# Patient Record
Sex: FEMALE | Employment: FULL TIME | URBAN - METROPOLITAN AREA
[De-identification: names, ages, dates, MRNs, and addresses within clinical notes are randomized per-mention and may not be internally consistent; named-entity substitution may affect disease eponyms.]

---

## 2023-06-05 ENCOUNTER — HOSPITAL ENCOUNTER (OUTPATIENT)
Dept: PHYSICAL THERAPY | Facility: CLINIC | Age: 43
Setting detail: THERAPIES SERIES
Discharge: HOME | End: 2023-06-05
Payer: COMMERCIAL

## 2023-06-05 PROCEDURE — 97110 THERAPEUTIC EXERCISES: CPT

## 2023-06-05 PROCEDURE — 97161 PT EVAL LOW COMPLEX 20 MIN: CPT

## 2023-06-05 NOTE — CONSULTS
task    [] Previously independent with all except: [x] Currently independent with all except:    Bathing  [] Assist [] Assist    Dress/grooming [] Assist [] Assist    Transfer/mobility [] Assist [] Assist    Feeding [] Assist [] Assist    Toileting [] Assist [] Assist    Driving [] Assist [] Assist    Housekeeping [] Assist [x] Assist     Grocery shop/meal prep [] Assist [x] Assist        Gait Prior level of function Current level of function    [x] Independent  [] Assist [x] Independent  [] Assist   Device: [x] Independent [x] Independent    [] Straight Cane [] Quad cane [] Straight Cane [] Quad cane    [] Standard walker [] Rolling walker   [] 4 wheeled walker [] Standard walker [] Rolling walker   [] 4 wheeled walker    [] Wheelchair [] Wheelchair       Function:  Hand Dominance  [x] Right  [] Left  Marital Status Lives with her    Employement Remote medical coder   Job status Full time  Took some time off due to the concussion symptoms   Work Activities/duties  Desk job, flexible hours  Slight upward position of her monitors   Recreational Activities Hiking, reading         Pain present? Yes    Location Neck    Pain Rating currently 2/10   Pain at worse Neck: 4/10  Back: 3/10    Pain at best 0/10   Description of pain Neck: stiffness, some sharp pain in the lower/middle neck   Altered Sensation Intact    What makes it worse Bending head down, turning her head   What makes it better Heat, alberto-Jones, ibuprofen, tylenol   Symptom progression Gradually improving   Sleep Interrupted sleep, unrelated to her neck pain   Sleeps on her side            Objective:        STRENGTH  STRENGTH    Left Right  Left Right   C5 Shld Abd 5 5 L1-2 Hip Flex 5 5   Shld Flexion 5 5 Hip Abd     Shld IR   L3-4 Knee Ext 5 5   Shld ER   L4 Ankle DF 5 5   C6 Elb Flex 5 5 L5 EHL     C7 Elb Ext 5 5 S1 Plant.  Flex     C8 EPL   Abdominals     T1 Fing Abd   Erector Spinae         Cervical ROM Left Right   Flexion WFL*

## 2023-06-08 ENCOUNTER — HOSPITAL ENCOUNTER (OUTPATIENT)
Dept: PHYSICAL THERAPY | Facility: CLINIC | Age: 43
Setting detail: THERAPIES SERIES
Discharge: HOME | End: 2023-06-08
Payer: COMMERCIAL

## 2023-06-08 PROCEDURE — 97110 THERAPEUTIC EXERCISES: CPT

## 2023-06-08 NOTE — FLOWSHEET NOTE
[email or web address]  Date: 06/05/2023  Prepared by: Shanon Keith     Exercises  - Seated Scapular Retraction  - 3-4 x daily - 7 x weekly - 2 sets - 10 reps - 5 sec hold  - Seated Shoulder Circles  - 3-4 x daily - 7 x weekly - 2 sets - 10 reps  - Supine Chin Tuck  - 1-2 x daily - 7 x weekly - 2 sets - 10 reps - 5 sec hold  - Supine Cervical Rotation AROM on Pillow  - 1-2 x daily - 7 x weekly - 2 sets - 10 reps - 5 sec hold  - Gentle Levator Scapulae Stretch  - 1-2 x daily - 5 x weekly - 2 sets - 3 reps - 15 hold  - Seated Gentle Upper Trapezius Stretch  - 1-2 x daily - 5 x weekly - 2 sets - 3 reps - 15 hold  - Correct Seated Posture  - 1 x daily - 7 x weekly - 3 sets - 10 reps    Comprehension of Education:  [x] Verbalizes understanding. [] Demonstrates understanding. [] Needs review. [x] Demonstrates/verbalizes HEP/Ed previously given. Plan: [x] Continue current frequency toward long and short term goals.           Time In: 3:00pm            Time Out: 3:45pm    Electronically signed by:  Tete Chambers PTA

## 2023-06-19 ENCOUNTER — HOSPITAL ENCOUNTER (OUTPATIENT)
Dept: PHYSICAL THERAPY | Facility: CLINIC | Age: 43
Setting detail: THERAPIES SERIES
Discharge: HOME | End: 2023-06-19
Payer: COMMERCIAL

## 2023-06-19 PROCEDURE — 97110 THERAPEUTIC EXERCISES: CPT

## 2023-06-19 PROCEDURE — 97140 MANUAL THERAPY 1/> REGIONS: CPT

## 2023-06-19 NOTE — FLOWSHEET NOTE
[x] [facility]  Outpatient Rehabilitation &  Therapy  [facility]   [place]: [phone]  F: [phone]      Physical Therapy Daily Treatment Note    Date:  2023  Patient Name:  Anna Angel    :  1980  MRN: [id number]  Physician: Dr. Blane Montalvo: Medical [place] ( vs, auth after )  Medical Diagnosis: V49.50XA, Sprain of ligaments of cervical spine (S13.4XXA) Concussion with loss of consciousness (S06.0X0A), Pain in left shoulder (M25.512), Myalgia (M79.18), Low back pain unspecified (M54.50)  Rehab Codes: M25.512, M54.2, R29.3, M62.838   Onset Date: 23                 Next 's appt. : --   Visit# / total visits:     Cancels/No Shows: 0/0    Subjective:    Pain:  [] Yes  [x] No Location: neck pain  Rating: (0-10 scale) 0/10  Pain altered Tx:  [x] No  [] Yes  Action:  Comments: Patient arrives reporting that she had some pain over the weekend. On her way to therapy today she fell stepping out of her garage. Feels that she just \"was not paying attention. \" Requests that we refrain from too many standing or leg exercises. Objective:   Todays Treatment:  Exercises:  Exercise    [id number] Reps/ Time Weight/ Level Comments             Pulley's   2/'/2' Flex/abd               Corner Pectoral Stretch  3x30\"  T/Y               SB gastroc stretch  held     Supine HS strap stretch  held bilat    Hip ER stretch  held     Posterior pelvic tilts with march  held     Seated scapular retractions  20x5\"     Standing Chin Tucks 10x10\"       Upper Trap Stretch  3x20\" bilat    Levator Scap Stretch  3x20\" bilat           Tband          Rows   x20 Green     Extension   x20 Green     Bilat ER   x20 Green     Bilat horiz abd   x20 Green               Shoulder abduction  2x10 1# Standing at wall    Shoulder scaption  2x10 1# Standing at wall    Shoulder flexion  2x10 1# Standing at wall          Other:     FRSL C5-C6

## 2023-06-22 ENCOUNTER — HOSPITAL ENCOUNTER (OUTPATIENT)
Dept: PHYSICAL THERAPY | Facility: CLINIC | Age: 43
Setting detail: THERAPIES SERIES
Discharge: HOME | End: 2023-06-22
Payer: COMMERCIAL

## 2023-06-22 PROCEDURE — 97110 THERAPEUTIC EXERCISES: CPT

## 2023-06-22 PROCEDURE — 97140 MANUAL THERAPY 1/> REGIONS: CPT

## 2023-06-22 NOTE — FLOWSHEET NOTE
[x] [facility]  Outpatient Rehabilitation &  Therapy  [facility]   [place]: [phone]  F: [phone]      Physical Therapy Daily Treatment Note    Date:  2023  Patient Name:  Mary Velazquez    :  1980  MRN: [id number]  Physician: Dr. Wendy Hinson: Medical [place] ( vs, auth after )  Medical Diagnosis: V49.50XA, Sprain of ligaments of cervical spine (S13.4XXA) Concussion with loss of consciousness (S06.0X0A), Pain in left shoulder (M25.512), Myalgia (M79.18), Low back pain unspecified (M54.50)  Rehab Codes: M25.512, M54.2, R29.3, M62.838   Onset Date: 23                 Next 's appt. : --   Visit# / total visits:     Cancels/No Shows: 0/0    Subjective:    Pain:  [] Yes  [x] No Location: neck pain  Rating: (0-10 scale) 0/10  Pain altered Tx:  [x] No  [] Yes  Action:  Comments: Patient arrives stating improved neck pain today. Reports she got her nails done last Saturday and had pain that day and  because she was sitting in the chair to get her nails done for 1.5-2 hours. Had mild low back pain yesterday because she was driving and the traffic made her nervous. Patient reports she had some pain near her jaw on the left side yesterday, none today. Objective:   Todays Treatment:  Exercises:  Exercise    [id number] Reps/ Time Weight/ Level Comments             Pulley's   2/2' Flex/abd               Standing Chin Tucks 10x10\"       Upper Trap Stretch  3x30\" bilat    Levator Scap Stretch  3x30\" bilat     Corner pect stretch  3x30\" Y/T    Scalene stretch  3x15\"  HEP   Mid-back stretch  3x15\"  HEP         Tband          Rows   x20 Green     Extension   x20 Green     Bilat ER   x20 Green     Bilat horiz abd   x20 Green               Shoulder abduction  x10 1# Standing at wall    Shoulder scaption  x10 1# Standing at wall    Shoulder flexion  x10 1# Standing at wall          Prone Bench      Rows  x10

## 2023-06-26 ENCOUNTER — HOSPITAL ENCOUNTER (OUTPATIENT)
Dept: PHYSICAL THERAPY | Facility: CLINIC | Age: 43
Setting detail: THERAPIES SERIES
Discharge: HOME | End: 2023-06-26
Payer: COMMERCIAL

## 2023-06-26 PROCEDURE — 97110 THERAPEUTIC EXERCISES: CPT

## 2023-06-26 PROCEDURE — 97140 MANUAL THERAPY 1/> REGIONS: CPT

## 2023-06-29 ENCOUNTER — HOSPITAL ENCOUNTER (OUTPATIENT)
Dept: PHYSICAL THERAPY | Facility: CLINIC | Age: 43
Setting detail: THERAPIES SERIES
Discharge: HOME | End: 2023-06-29
Payer: COMMERCIAL

## 2023-06-29 PROCEDURE — 97110 THERAPEUTIC EXERCISES: CPT

## 2023-07-06 ENCOUNTER — HOSPITAL ENCOUNTER (OUTPATIENT)
Dept: PHYSICAL THERAPY | Facility: CLINIC | Age: 43
Setting detail: THERAPIES SERIES
Discharge: HOME | End: 2023-07-06
Payer: COMMERCIAL

## 2023-07-06 PROCEDURE — 97140 MANUAL THERAPY 1/> REGIONS: CPT

## 2023-07-06 PROCEDURE — 97110 THERAPEUTIC EXERCISES: CPT

## 2023-07-10 ENCOUNTER — HOSPITAL ENCOUNTER (OUTPATIENT)
Dept: PHYSICAL THERAPY | Facility: CLINIC | Age: 43
Setting detail: THERAPIES SERIES
Discharge: HOME | End: 2023-07-10
Payer: COMMERCIAL

## 2023-07-10 PROCEDURE — 97110 THERAPEUTIC EXERCISES: CPT

## 2023-07-10 NOTE — FLOWSHEET NOTE
pain, get back to exercise         Rehab Potential:  [x] Good  [] Fair  [] Poor   Suggested Professional Referral:  [x] No  [] Yes:  Barriers to Goal Achievement[de-identified]  [x] No  [] Yes:  Domestic Concerns:  [x] No  [] Yes:    Pt. Education:  [x] Yes  [] No  [x] Reviewed Prior HEP/Ed, cervical stretching, decrease time and speed on treadmill, avoid upper trap compensation. Method of Education: [x] Verbal  [x] Demo  [] Written    Access Code: [email or web address]  URL: [email or web address]  Date: 06/19/2023  Prepared by: Cleve Debar    Exercises  - Standing Shoulder Row with Anchored Resistance  - 1 x daily - 7 x weekly - 3 sets - 10 reps  - Shoulder Extension with Resistance  - 1 x daily - 7 x weekly - 3 sets - 10 reps  - Standing Shoulder External Rotation with Resistance  - 1 x daily - 7 x weekly - 3 sets - 10 reps  - Standing Shoulder Horizontal Abduction with Resistance  - 1 x daily - 7 x weekly - 3 sets - 10 reps  - Wall Push Up with Plus  - 1 x daily - 7 x weekly - 3 sets - 10 reps    Access Code: [email or web address]  URL: [email or web address]/  Date: 06/05/2023  Prepared by: Cleve Debar     Exercises  - Seated Scapular Retraction  - 3-4 x daily - 7 x weekly - 2 sets - 10 reps - 5 sec hold  - Seated Shoulder Circles  - 3-4 x daily - 7 x weekly - 2 sets - 10 reps  - Supine Chin Tuck  - 1-2 x daily - 7 x weekly - 2 sets - 10 reps - 5 sec hold  - Supine Cervical Rotation AROM on Pillow  - 1-2 x daily - 7 x weekly - 2 sets - 10 reps - 5 sec hold  - Gentle Levator Scapulae Stretch  - 1-2 x daily - 5 x weekly - 2 sets - 3 reps - 15 hold  - Seated Gentle Upper Trapezius Stretch  - 1-2 x daily - 5 x weekly - 2 sets - 3 reps - 15 hold  - Correct Seated Posture  - 1 x daily - 7 x weekly - 3 sets - 10 reps      Comprehension of Education:  [x] Verbalizes understanding. [] Demonstrates understanding. [] Needs review. [x] Demonstrates/verbalizes HEP/Ed previously given.      Plan: [x] Continue current frequency toward

## 2023-07-13 ENCOUNTER — APPOINTMENT (OUTPATIENT)
Dept: PHYSICAL THERAPY | Facility: CLINIC | Age: 43
End: 2023-07-13
Payer: COMMERCIAL

## 2023-07-18 ENCOUNTER — HOSPITAL ENCOUNTER (OUTPATIENT)
Dept: PHYSICAL THERAPY | Facility: CLINIC | Age: 43
Setting detail: THERAPIES SERIES
Discharge: HOME | End: 2023-07-18
Payer: COMMERCIAL

## 2023-07-18 PROCEDURE — 97110 THERAPEUTIC EXERCISES: CPT

## 2023-08-01 ENCOUNTER — HOSPITAL ENCOUNTER (OUTPATIENT)
Dept: PHYSICAL THERAPY | Facility: CLINIC | Age: 43
Setting detail: THERAPIES SERIES
Discharge: HOME | End: 2023-08-01
Payer: COMMERCIAL

## 2023-08-01 PROCEDURE — 97161 PT EVAL LOW COMPLEX 20 MIN: CPT

## 2023-08-01 PROCEDURE — 97110 THERAPEUTIC EXERCISES: CPT

## 2023-08-09 ENCOUNTER — HOSPITAL ENCOUNTER (OUTPATIENT)
Dept: PHYSICAL THERAPY | Facility: CLINIC | Age: 43
Setting detail: THERAPIES SERIES
Discharge: HOME | End: 2023-08-09
Payer: COMMERCIAL

## 2023-08-09 PROCEDURE — 97110 THERAPEUTIC EXERCISES: CPT

## 2023-08-15 ENCOUNTER — HOSPITAL ENCOUNTER (OUTPATIENT)
Dept: PHYSICAL THERAPY | Facility: CLINIC | Age: 43
Setting detail: THERAPIES SERIES
Discharge: HOME | End: 2023-08-15
Payer: COMMERCIAL

## 2023-08-15 PROCEDURE — 97110 THERAPEUTIC EXERCISES: CPT

## 2023-08-17 ENCOUNTER — HOSPITAL ENCOUNTER (OUTPATIENT)
Dept: PHYSICAL THERAPY | Facility: CLINIC | Age: 43
Setting detail: THERAPIES SERIES
Discharge: HOME | End: 2023-08-17
Payer: COMMERCIAL

## 2023-08-17 PROCEDURE — 97110 THERAPEUTIC EXERCISES: CPT

## 2023-08-22 ENCOUNTER — HOSPITAL ENCOUNTER (OUTPATIENT)
Dept: PHYSICAL THERAPY | Facility: CLINIC | Age: 43
Setting detail: THERAPIES SERIES
Discharge: HOME | End: 2023-08-22
Payer: COMMERCIAL

## 2023-08-22 PROCEDURE — 97110 THERAPEUTIC EXERCISES: CPT

## 2023-08-24 ENCOUNTER — HOSPITAL ENCOUNTER (OUTPATIENT)
Dept: PHYSICAL THERAPY | Facility: CLINIC | Age: 43
Setting detail: THERAPIES SERIES
Discharge: HOME | End: 2023-08-24
Payer: COMMERCIAL

## 2023-08-24 NOTE — CARE COORDINATION
[] [facility] - [facility] &  Therapy  [address]    [phone]  F: [phone]   [] [address] [facility]  [address]   [address]  P: [phone]  F: [phone]  [] [address] &  Therapy  [address] [facility]  P: [phone]  F: [phone] [] Jeff Chauhan  P: [phone]  F: [phone]  [x] [address] [facility]  [address] [address]   [address]   [place]: [phone]  F: [phone]   [] [address] [facility]  [address]  [place]: [phone]   F: [phone]     Physical Therapy Cancel/No Show note    Date: 2023  Patient: Kelly Bowles  : 1980  MRN: [id number]    Cancels/No Shows to date: 1    For today's appointment patient:    [x]  Cancelled    [] Rescheduled appointment    [] No-show     Reason given by patient:    [x]  Patient ill    []  Conflicting appointment    [] No transportation      [] Conflict with work    [] No reason given    [] Weather related    [] [id number]    [] Other:      Comments:        [] Next appointment was confirmed    Electronically signed by: Rudy Luna PT

## 2023-09-15 ENCOUNTER — OFFICE VISIT (OUTPATIENT)
Age: 43
End: 2023-09-15

## 2023-09-15 VITALS
HEIGHT: 65 IN | BODY MASS INDEX: 34.29 KG/M2 | HEART RATE: 75 BPM | SYSTOLIC BLOOD PRESSURE: 130 MMHG | DIASTOLIC BLOOD PRESSURE: 74 MMHG | WEIGHT: 205.8 LBS

## 2023-09-15 DIAGNOSIS — Z12.31 ENCOUNTER FOR SCREENING MAMMOGRAM FOR BREAST CANCER: ICD-10-CM

## 2023-09-15 DIAGNOSIS — F33.41 RECURRENT MAJOR DEPRESSIVE DISORDER, IN PARTIAL REMISSION (HCC): Primary | ICD-10-CM

## 2023-09-15 DIAGNOSIS — K57.90 DIVERTICULOSIS: Primary | ICD-10-CM

## 2023-09-15 DIAGNOSIS — F41.8 SITUATIONAL ANXIETY: ICD-10-CM

## 2023-09-15 PROBLEM — E78.5 HYPERLIPIDEMIA: Status: ACTIVE | Noted: 2023-09-15

## 2023-09-15 PROBLEM — G25.0 ESSENTIAL TREMOR: Status: ACTIVE | Noted: 2021-11-15

## 2023-09-15 PROBLEM — R20.0 NUMBNESS OF HAND: Status: ACTIVE | Noted: 2023-09-15

## 2023-09-15 PROBLEM — F41.9 ANXIETY: Status: ACTIVE | Noted: 2022-06-07

## 2023-09-15 PROBLEM — K59.00 CONSTIPATION: Status: ACTIVE | Noted: 2022-01-27

## 2023-09-15 PROBLEM — F32.A DEPRESSION: Status: ACTIVE | Noted: 2022-06-07

## 2023-09-15 PROBLEM — N92.0 MENORRHAGIA WITH REGULAR CYCLE: Status: ACTIVE | Noted: 2022-01-27

## 2023-09-15 PROBLEM — I10 HYPERTENSION: Status: ACTIVE | Noted: 2023-09-15

## 2023-09-15 PROBLEM — E11.9 TYPE 2 DIABETES MELLITUS WITHOUT COMPLICATION (HCC): Status: ACTIVE | Noted: 2023-09-15

## 2023-09-15 PROBLEM — E03.9 HYPOTHYROIDISM: Status: ACTIVE | Noted: 2022-06-07

## 2023-09-15 PROBLEM — N94.819 VULVODYNIA: Status: ACTIVE | Noted: 2022-01-27

## 2023-09-15 PROBLEM — R73.03 PRE-DIABETES: Status: ACTIVE | Noted: 2019-03-18

## 2023-09-15 PROBLEM — K21.9 GERD (GASTROESOPHAGEAL REFLUX DISEASE): Status: ACTIVE | Noted: 2023-09-15

## 2023-09-15 PROBLEM — R56.9 SEIZURES, GENERALIZED CONVULSIVE (HCC): Status: ACTIVE | Noted: 2021-11-15

## 2023-09-15 PROBLEM — G47.00 INSOMNIA: Status: ACTIVE | Noted: 2021-11-15

## 2023-09-15 PROBLEM — G56.20 LESION OF ULNAR NERVE: Status: ACTIVE | Noted: 2023-09-15

## 2023-09-15 PROBLEM — M62.89 HIGH-TONE PELVIC FLOOR DYSFUNCTION IN FEMALE: Status: ACTIVE | Noted: 2022-09-13

## 2023-09-15 RX ORDER — LIDOCAINE 50 MG/G
OINTMENT TOPICAL NIGHTLY
COMMUNITY
Start: 2022-01-27

## 2023-09-15 RX ORDER — SERTRALINE HYDROCHLORIDE 100 MG/1
200 TABLET, FILM COATED ORAL DAILY
COMMUNITY
Start: 2023-09-05 | End: 2023-09-15 | Stop reason: SDUPTHER

## 2023-09-15 RX ORDER — IBUPROFEN 800 MG/1
800 TABLET ORAL EVERY 8 HOURS PRN
COMMUNITY
Start: 2021-11-09 | End: 2023-09-15 | Stop reason: SDUPTHER

## 2023-09-15 RX ORDER — TRANEXAMIC ACID 650 MG/1
TABLET ORAL
COMMUNITY
Start: 2023-06-27

## 2023-09-15 RX ORDER — ERGOCALCIFEROL 1.25 MG/1
50000 CAPSULE ORAL WEEKLY
COMMUNITY
Start: 2023-07-12

## 2023-09-15 RX ORDER — SERTRALINE HYDROCHLORIDE 100 MG/1
200 TABLET, FILM COATED ORAL DAILY
Qty: 60 TABLET | Refills: 5 | Status: SHIPPED | OUTPATIENT
Start: 2023-09-15

## 2023-09-15 RX ORDER — LEVOTHYROXINE SODIUM 0.03 MG/1
25 TABLET ORAL DAILY
COMMUNITY
Start: 2023-08-05

## 2023-09-15 RX ORDER — IBUPROFEN 800 MG/1
800 TABLET ORAL EVERY 8 HOURS PRN
Qty: 120 TABLET | Refills: 1 | Status: SHIPPED | OUTPATIENT
Start: 2023-09-15

## 2023-09-15 RX ORDER — LORAZEPAM 0.5 MG/1
0.5 TABLET ORAL EVERY 8 HOURS PRN
Qty: 10 TABLET | Refills: 0 | Status: SHIPPED | OUTPATIENT
Start: 2023-09-15 | End: 2023-09-20

## 2023-09-15 RX ORDER — LORAZEPAM 0.5 MG/1
TABLET ORAL
COMMUNITY
Start: 2014-12-19 | End: 2023-09-15 | Stop reason: SDUPTHER

## 2023-09-15 RX ORDER — OXCARBAZEPINE 600 MG/1
TABLET, FILM COATED ORAL
COMMUNITY
Start: 2023-08-19

## 2023-09-15 SDOH — ECONOMIC STABILITY: FOOD INSECURITY: WITHIN THE PAST 12 MONTHS, YOU WORRIED THAT YOUR FOOD WOULD RUN OUT BEFORE YOU GOT MONEY TO BUY MORE.: NEVER TRUE

## 2023-09-15 SDOH — ECONOMIC STABILITY: HOUSING INSECURITY
IN THE LAST 12 MONTHS, WAS THERE A TIME WHEN YOU DID NOT HAVE A STEADY PLACE TO SLEEP OR SLEPT IN A SHELTER (INCLUDING NOW)?: NO

## 2023-09-15 SDOH — ECONOMIC STABILITY: FOOD INSECURITY: WITHIN THE PAST 12 MONTHS, THE FOOD YOU BOUGHT JUST DIDN'T LAST AND YOU DIDN'T HAVE MONEY TO GET MORE.: NEVER TRUE

## 2023-09-15 SDOH — ECONOMIC STABILITY: INCOME INSECURITY: HOW HARD IS IT FOR YOU TO PAY FOR THE VERY BASICS LIKE FOOD, HOUSING, MEDICAL CARE, AND HEATING?: NOT HARD AT ALL

## 2023-09-15 NOTE — PROGRESS NOTES
[address] [address]     Date of Visit:  9/15/2023  Patient Name: Bill Chapamn   Patient :  1980     CHIEF COMPLAINT/HPI:     Chief Complaint   Patient presents with    Check-Up     Per patient no pap today        HPI      Bill Chapman, 41 y.o. presents today for regular follow up. Patient has been seeing Anay Ash for regular paps. She was going to start coming here for paps but is now seeing Dr. Deepali Carvajal. Her last pap was about a year ago. Patient felt like her depression has been worse lately despite being on zoloft 200mg daily. She has tried multiple other medications in the past with side effects or without relief. She thinks it might be time to see a psychiatrist.      REVIEW OF SYSTEM      Review of Systems:   Constitutional:  Negative for chills, fatigue, fever and unexpected weight change. Eyes:  Negative for visual disturbance. Respiratory:  Negative for cough, chest tightness, shortness of breath and wheezing. Cardiovascular:  Negative for chest pain, palpitations and leg swelling. Gastrointestinal:  Negative for abdominal distention, abdominal pain, blood in stool, constipation, diarrhea, nausea and vomiting. Genitourinary:  Negative for dysuria, hematuria and urgency. Musculoskeletal:  Negative for back pain, neck pain and neck stiffness. Skin:  Negative for rash and wound. Neurological:  Negative for syncope, weakness, light-headedness and headaches. Hematological:  Negative for adenopathy. Does not bruise/bleed easily. Psychiatric/Behavioral:  Negative for suicidal ideas. The patient is not nervous/anxious.       REVIEWED INFORMATION      Allergies   Allergen Reactions    Pcn [Penicillins] Hives and Other (See Comments)    Doxycycline Hyclate Other (See Comments)    Levofloxacin Other (See Comments)     \"Joint pain\"         Current Outpatient Medications   Medication Sig

## 2023-10-04 ENCOUNTER — HOSPITAL ENCOUNTER (OUTPATIENT)
Dept: PHYSICAL THERAPY | Facility: CLINIC | Age: 43
Setting detail: THERAPIES SERIES
Discharge: HOME | End: 2023-10-04
Payer: COMMERCIAL

## 2023-10-04 PROCEDURE — 97110 THERAPEUTIC EXERCISES: CPT

## 2023-10-04 NOTE — FLOWSHEET NOTE
continue with progress single leg strength next visit. [] No change. [] Other:  [x] Patient would continue to benefit from skilled physical therapy services in order to: improve ankle strength and stability, improve gastroc/soleus length, and improve standing single leg stability to ease ADL's and allow her to hike without falling        STG/LTG  Goals  MET NOT MET ON-  GOING  Details   Date Addressed:            STG: To be met in 6 treatments            1. ? Pain: Decrease pain levels to 0/10 with ADLs []  []  []      2. ? ROM: Increase ankle DF AROM to at least 10 degrees to reduce difficulty with ADLs []  []  []      3. ? Strength: Increase ankle and hip MMT to 5/5 throughout to ease functional limitations and mobility  []  []  []      4. Independent with Home Exercise Programs []  []  []      5. Demonstrate knowledge of fall risk prevention  []  []  []        []  []  []      Date Addressed:            LTG: To be met in 12 treatments           1. Patient to report ability to walk and hike any distance without feelings of ankle stability or pain []  []  []      2. Patient to demonstrate a set of 25 heel raises suggesting improved PF strength required for ADL's  []  []  []        []  []  []                                  Patient goals: be able to walk/hike without falling      Rehab Potential:  [x] Good  [] Fair  [] Poor   Suggested Professional Referral:  [x] No  [] Yes:  Barriers to Goal Achievement:  [x] No  [] Yes:  Domestic Concerns:  [x] No  [] Yes:       Pt. Education:  [x] Yes  [] No  [x] Reviewed Prior HEP/Ed, continue previous HEP, single leg exercises. Method of Education: [x] Verbal  [x] Demo  [x] Written    Access Code: [email or web address]  URL: [email or web address]  Date: 08/01/2023  Prepared by: Ellen Lucas     Exercises  - Seated Lesser Toes Extension  - 1 x daily - 7 x weekly - 3 sets - 10 reps  - Seated Great Toe Extension  - 1 x daily - 7 x weekly - 3 sets - 10 reps  - Toe

## 2023-10-12 ENCOUNTER — HOSPITAL ENCOUNTER (OUTPATIENT)
Dept: PHYSICAL THERAPY | Facility: CLINIC | Age: 43
Setting detail: THERAPIES SERIES
Discharge: HOME | End: 2023-10-12
Payer: COMMERCIAL

## 2023-10-12 PROCEDURE — 97110 THERAPEUTIC EXERCISES: CPT

## 2023-10-18 ENCOUNTER — APPOINTMENT (OUTPATIENT)
Dept: PHYSICAL THERAPY | Facility: CLINIC | Age: 43
End: 2023-10-18
Payer: COMMERCIAL

## 2023-10-25 ENCOUNTER — HOSPITAL ENCOUNTER (OUTPATIENT)
Dept: PHYSICAL THERAPY | Facility: CLINIC | Age: 43
Setting detail: THERAPIES SERIES
Discharge: HOME | End: 2023-10-25
Payer: COMMERCIAL

## 2023-10-25 PROCEDURE — 97110 THERAPEUTIC EXERCISES: CPT

## 2023-10-25 NOTE — FLOWSHEET NOTE
Exercises  - Seated Lesser Toes Extension  - 1 x daily - 7 x weekly - 3 sets - 10 reps  - Seated Great Toe Extension  - 1 x daily - 7 x weekly - 3 sets - 10 reps  - Toe Spreading  - 1 x daily - 7 x weekly - 3 sets - 10 reps  - Long Sitting Calf Stretch with Strap  - 1 x daily - 7 x weekly - 3 sets - 30 sec hold  - Long Sitting Ankle Inversion with Resistance  - 1 x daily - 7 x weekly - 2 sets - 10 reps  - Long Sitting Ankle Eversion with Resistance  - 1 x daily - 7 x weekly - 2 sets - 10 reps  - Standing Bilateral Heel Raise on Step  - 1 x daily - 7 x weekly - 3 sets - 10 reps    Comprehension of Education:  [x] Verbalizes understanding. [] Demonstrates understanding. [x] Needs review. [] Demonstrates/verbalizes HEP/Ed previously given. Plan: [x] Continue current frequency toward long and short term goals.           Time In: 3:53pm            Time Out: 4:45pm      Electronically signed by:  Blanca Mckeon PTA

## 2023-11-02 ENCOUNTER — HOSPITAL ENCOUNTER (OUTPATIENT)
Dept: PHYSICAL THERAPY | Facility: CLINIC | Age: 43
Setting detail: THERAPIES SERIES
Discharge: HOME | End: 2023-11-02
Payer: COMMERCIAL

## 2023-11-02 PROCEDURE — 97110 THERAPEUTIC EXERCISES: CPT

## 2023-11-13 ENCOUNTER — OFFICE VISIT (OUTPATIENT)
Age: 43
End: 2023-11-13

## 2023-11-13 VITALS
RESPIRATION RATE: 18 BRPM | HEIGHT: 65 IN | BODY MASS INDEX: 34.2 KG/M2 | DIASTOLIC BLOOD PRESSURE: 72 MMHG | WEIGHT: 205.25 LBS | TEMPERATURE: 97.3 F | OXYGEN SATURATION: 98 % | HEART RATE: 71 BPM | SYSTOLIC BLOOD PRESSURE: 132 MMHG

## 2023-11-13 DIAGNOSIS — J20.9 ACUTE BRONCHITIS, UNSPECIFIED ORGANISM: Primary | ICD-10-CM

## 2023-11-13 DIAGNOSIS — R43.0 LOSS OF SMELL: ICD-10-CM

## 2023-11-13 RX ORDER — AZITHROMYCIN 250 MG/1
250 TABLET, FILM COATED ORAL SEE ADMIN INSTRUCTIONS
Qty: 6 TABLET | Refills: 0 | Status: SHIPPED | OUTPATIENT
Start: 2023-11-13 | End: 2023-11-18

## 2023-11-13 RX ORDER — METFORMIN HYDROCHLORIDE 500 MG/1
500 TABLET, EXTENDED RELEASE ORAL
COMMUNITY
Start: 2023-09-22

## 2023-11-13 RX ORDER — PREDNISONE 20 MG/1
TABLET ORAL
Qty: 18 TABLET | Refills: 0 | Status: SHIPPED | OUTPATIENT
Start: 2023-11-13

## 2023-11-13 ASSESSMENT — PATIENT HEALTH QUESTIONNAIRE - PHQ9
5. POOR APPETITE OR OVEREATING: 0
10. IF YOU CHECKED OFF ANY PROBLEMS, HOW DIFFICULT HAVE THESE PROBLEMS MADE IT FOR YOU TO DO YOUR WORK, TAKE CARE OF THINGS AT HOME, OR GET ALONG WITH OTHER PEOPLE: 0
4. FEELING TIRED OR HAVING LITTLE ENERGY: 0
6. FEELING BAD ABOUT YOURSELF - OR THAT YOU ARE A FAILURE OR HAVE LET YOURSELF OR YOUR FAMILY DOWN: 0
2. FEELING DOWN, DEPRESSED OR HOPELESS: 0
7. TROUBLE CONCENTRATING ON THINGS, SUCH AS READING THE NEWSPAPER OR WATCHING TELEVISION: 0
SUM OF ALL RESPONSES TO PHQ9 QUESTIONS 1 & 2: 0
SUM OF ALL RESPONSES TO PHQ QUESTIONS 1-9: 0
9. THOUGHTS THAT YOU WOULD BE BETTER OFF DEAD, OR OF HURTING YOURSELF: 0
1. LITTLE INTEREST OR PLEASURE IN DOING THINGS: 0
SUM OF ALL RESPONSES TO PHQ QUESTIONS 1-9: 0
8. MOVING OR SPEAKING SO SLOWLY THAT OTHER PEOPLE COULD HAVE NOTICED. OR THE OPPOSITE, BEING SO FIGETY OR RESTLESS THAT YOU HAVE BEEN MOVING AROUND A LOT MORE THAN USUAL: 0
3. TROUBLE FALLING OR STAYING ASLEEP: 0
SUM OF ALL RESPONSES TO PHQ QUESTIONS 1-9: 0
SUM OF ALL RESPONSES TO PHQ QUESTIONS 1-9: 0

## 2023-11-13 NOTE — PROGRESS NOTES
[facility]      Date of Visit:  2023  Patient Name: Micha Fuentes   Patient :  1980     CHIEF COMPLAINT/HPI:     Micha Fuentes is a 41 y.o. female who presents today for an general visit to be evaluated for the following condition(s):  Chief Complaint   Patient presents with    Congestion    Cough     Patient is here today for a cough, congestion, loss of smell. OTC medication not really working. States that the congestion is in her chest. The cough picks up mostly after she eats. Have not felt like eating        REVIEW OF SYSTEM      Review of Systems   Respiratory:  Negative for chest tightness and shortness of breath. Cardiovascular:  Negative for chest pain. REVIEWED INFORMATION      Allergies   Allergen Reactions    Pcn [Penicillins] Hives and Other (See Comments)    Doxycycline Hyclate Other (See Comments)    Levofloxacin Other (See Comments)     \"Joint pain\"         Current Outpatient Medications   Medication Sig Dispense Refill    metFORMIN (GLUCOPHAGE-XR) 500 MG extended release tablet Take 1 tablet by mouth daily (with breakfast)      azithromycin (ZITHROMAX) 250 MG tablet Take 1 tablet by mouth See Admin Instructions for 5 days 500mg on day 1 followed by 250mg on days 2 - 5 6 tablet 0    predniSONE (DELTASONE) 20 MG tablet 60 mg X 3 days then 40 mg X 3 days then 20 mg X 3 days then off; take with food 18 tablet 0    vitamin D (ERGOCALCIFEROL) 1.25 MG (63688 UT) CAPS capsule Take 1 capsule by mouth once a week      levothyroxine (SYNTHROID) 25 MCG tablet Take 1 tablet by mouth daily      OXcarbazepine (TRILEPTAL) 600 MG tablet Take 1 tablet (600 mg total) by mouth in the morning and 1 tablet (600 mg total) before bedtime.       tranexamic acid (LYSTEDA) 650 MG TABS tablet TAKE 2 TABLETS BY MOUTH THREE TIMES A DAY, take for up to 5 days with your period      ibuprofen (ADVIL;MOTRIN) 800 MG tablet Take 1 tablet by mouth every 8 hours as needed for Pain 120 tablet 1

## 2023-11-14 ASSESSMENT — ENCOUNTER SYMPTOMS
CHEST TIGHTNESS: 0
SHORTNESS OF BREATH: 0

## 2024-03-21 ENCOUNTER — TELEPHONE (OUTPATIENT)
Age: 44
End: 2024-03-21

## 2024-03-21 RX ORDER — CEPHALEXIN 500 MG/1
500 CAPSULE ORAL 3 TIMES DAILY
Qty: 30 CAPSULE | Refills: 0 | Status: SHIPPED | OUTPATIENT
Start: 2024-03-21 | End: 2024-03-31

## 2024-03-21 NOTE — TELEPHONE ENCOUNTER
Patient is calling to see if you would send her something in for a sinus infection that she has had over a week now. Her pharmacy is correct in her chart. Okay to leave a detailed message

## 2024-05-17 ENCOUNTER — OFFICE VISIT (OUTPATIENT)
Age: 44
End: 2024-05-17
Payer: COMMERCIAL

## 2024-05-17 VITALS
SYSTOLIC BLOOD PRESSURE: 110 MMHG | WEIGHT: 202 LBS | HEIGHT: 65 IN | OXYGEN SATURATION: 98 % | DIASTOLIC BLOOD PRESSURE: 80 MMHG | TEMPERATURE: 97.8 F | HEART RATE: 74 BPM | BODY MASS INDEX: 33.66 KG/M2

## 2024-05-17 DIAGNOSIS — F33.41 RECURRENT MAJOR DEPRESSIVE DISORDER, IN PARTIAL REMISSION (HCC): ICD-10-CM

## 2024-05-17 DIAGNOSIS — R23.3 EASY BRUISING: Primary | ICD-10-CM

## 2024-05-17 DIAGNOSIS — N92.0 MENORRHAGIA WITH REGULAR CYCLE: ICD-10-CM

## 2024-05-17 DIAGNOSIS — F41.8 SITUATIONAL ANXIETY: ICD-10-CM

## 2024-05-17 PROBLEM — E78.5 HYPERLIPIDEMIA: Status: RESOLVED | Noted: 2023-09-15 | Resolved: 2024-05-17

## 2024-05-17 PROBLEM — I10 HYPERTENSION: Status: RESOLVED | Noted: 2023-09-15 | Resolved: 2024-05-17

## 2024-05-17 PROBLEM — R73.03 PRE-DIABETES: Status: RESOLVED | Noted: 2019-03-18 | Resolved: 2024-05-17

## 2024-05-17 PROBLEM — E11.9 TYPE 2 DIABETES MELLITUS WITHOUT COMPLICATION (HCC): Status: RESOLVED | Noted: 2023-09-15 | Resolved: 2024-05-17

## 2024-05-17 PROBLEM — K57.90 DIVERTICULOSIS: Status: RESOLVED | Noted: 2023-09-15 | Resolved: 2024-05-17

## 2024-05-17 PROCEDURE — 1036F TOBACCO NON-USER: CPT | Performed by: FAMILY MEDICINE

## 2024-05-17 PROCEDURE — G8427 DOCREV CUR MEDS BY ELIG CLIN: HCPCS | Performed by: FAMILY MEDICINE

## 2024-05-17 PROCEDURE — G8417 CALC BMI ABV UP PARAM F/U: HCPCS | Performed by: FAMILY MEDICINE

## 2024-05-17 PROCEDURE — 99213 OFFICE O/P EST LOW 20 MIN: CPT | Performed by: FAMILY MEDICINE

## 2024-05-17 RX ORDER — LORAZEPAM 0.5 MG/1
0.5 TABLET ORAL EVERY 8 HOURS PRN
COMMUNITY

## 2024-05-17 RX ORDER — SERTRALINE HYDROCHLORIDE 100 MG/1
200 TABLET, FILM COATED ORAL DAILY
Qty: 60 TABLET | Refills: 5 | Status: SHIPPED | OUTPATIENT
Start: 2024-05-17

## 2024-05-17 ASSESSMENT — PATIENT HEALTH QUESTIONNAIRE - PHQ9
9. THOUGHTS THAT YOU WOULD BE BETTER OFF DEAD, OR OF HURTING YOURSELF: NOT AT ALL
SUM OF ALL RESPONSES TO PHQ9 QUESTIONS 1 & 2: 0
1. LITTLE INTEREST OR PLEASURE IN DOING THINGS: NOT AT ALL
6. FEELING BAD ABOUT YOURSELF - OR THAT YOU ARE A FAILURE OR HAVE LET YOURSELF OR YOUR FAMILY DOWN: NOT AT ALL
10. IF YOU CHECKED OFF ANY PROBLEMS, HOW DIFFICULT HAVE THESE PROBLEMS MADE IT FOR YOU TO DO YOUR WORK, TAKE CARE OF THINGS AT HOME, OR GET ALONG WITH OTHER PEOPLE: NOT DIFFICULT AT ALL
SUM OF ALL RESPONSES TO PHQ QUESTIONS 1-9: 0
SUM OF ALL RESPONSES TO PHQ QUESTIONS 1-9: 0
7. TROUBLE CONCENTRATING ON THINGS, SUCH AS READING THE NEWSPAPER OR WATCHING TELEVISION: NOT AT ALL
3. TROUBLE FALLING OR STAYING ASLEEP: NOT AT ALL
SUM OF ALL RESPONSES TO PHQ QUESTIONS 1-9: 0
SUM OF ALL RESPONSES TO PHQ QUESTIONS 1-9: 0
4. FEELING TIRED OR HAVING LITTLE ENERGY: NOT AT ALL
8. MOVING OR SPEAKING SO SLOWLY THAT OTHER PEOPLE COULD HAVE NOTICED. OR THE OPPOSITE, BEING SO FIGETY OR RESTLESS THAT YOU HAVE BEEN MOVING AROUND A LOT MORE THAN USUAL: NOT AT ALL
2. FEELING DOWN, DEPRESSED OR HOPELESS: NOT AT ALL
5. POOR APPETITE OR OVEREATING: NOT AT ALL

## 2024-05-17 NOTE — PROGRESS NOTES
MG (77112 UT) CAPS capsule Take 1 capsule by mouth once a week      levothyroxine (SYNTHROID) 25 MCG tablet Take 1 tablet by mouth daily      OXcarbazepine (TRILEPTAL) 600 MG tablet Take 1 tablet (600 mg total) by mouth in the morning and 1 tablet (600 mg total) before bedtime.      ibuprofen (ADVIL;MOTRIN) 800 MG tablet Take 1 tablet by mouth every 8 hours as needed for Pain 120 tablet 1     No current facility-administered medications for this visit.        Patient Active Problem List   Diagnosis    Anxiety    Constipation    Depression    Epileptic seizure (HCC)    Essential tremor    High-tone pelvic floor dysfunction in female    Hypothyroidism    Insomnia    Lesion of ulnar nerve    Menorrhagia with regular cycle    Numbness of hand    Seizures, generalized convulsive (HCC)    Vitamin D deficiency    Vulvodynia       Past Medical History:   Diagnosis Date    Anxiety     Depression     Epilepsy (HCC)     Essential tremor     GERD (gastroesophageal reflux disease)     Other specified hypothyroidism     Vitamin D deficiency        Past Surgical History:   Procedure Laterality Date    COLONOSCOPY      DENTAL SURGERY      DILATION AND CURETTAGE OF UTERUS      ESOPHAGOGASTRODUODENOSCOPY      HAND SURGERY Left     TONSILLECTOMY          Social History     Socioeconomic History    Marital status:      Spouse name: None    Number of children: None    Years of education: None    Highest education level: None   Tobacco Use    Smoking status: Never    Smokeless tobacco: Never   Substance and Sexual Activity    Alcohol use: Never     Social Determinants of Health     Financial Resource Strain: Low Risk  (9/15/2023)    Overall Financial Resource Strain (CARDIA)     Difficulty of Paying Living Expenses: Not hard at all   Transportation Needs: Unknown (9/15/2023)    PRAPARE - Transportation     Lack of Transportation (Non-Medical): No   Housing Stability: Unknown (9/15/2023)    Housing Stability Vital Sign     Unstable

## 2024-06-07 ENCOUNTER — OFFICE VISIT (OUTPATIENT)
Age: 44
End: 2024-06-07
Payer: COMMERCIAL

## 2024-06-07 VITALS
RESPIRATION RATE: 18 BRPM | SYSTOLIC BLOOD PRESSURE: 110 MMHG | HEIGHT: 65 IN | HEART RATE: 74 BPM | DIASTOLIC BLOOD PRESSURE: 74 MMHG | WEIGHT: 208.13 LBS | BODY MASS INDEX: 34.68 KG/M2 | OXYGEN SATURATION: 100 % | TEMPERATURE: 98.3 F

## 2024-06-07 DIAGNOSIS — R10.31 RLQ ABDOMINAL PAIN: Primary | ICD-10-CM

## 2024-06-07 DIAGNOSIS — K59.01 SLOW TRANSIT CONSTIPATION: ICD-10-CM

## 2024-06-07 LAB
BASOPHILS ABSOLUTE: 0.03 /ΜL
BASOPHILS RELATIVE PERCENT: 0.4 %
BILIRUBIN URINE: NORMAL
BLOOD, URINE: NEGATIVE
CLARITY: CLEAR
COLOR: YELLOW
EOSINOPHILS ABSOLUTE: 0.11 /ΜL
EOSINOPHILS RELATIVE PERCENT: 1.4 %
GLUCOSE URINE: NORMAL
HCG QUANTITATIVE: NORMAL
HCT VFR BLD CALC: 40 % (ref 36–46)
HEMOGLOBIN: 14 G/DL (ref 12–16)
KETONES, URINE: NEGATIVE
LEUKOCYTE ESTERASE, URINE: NEGATIVE
LYMPHOCYTES ABSOLUTE: 1.84 /ΜL
LYMPHOCYTES RELATIVE PERCENT: 23.4 %
MCH RBC QN AUTO: 27.9 PG
MCHC RBC AUTO-ENTMCNC: 34.9 G/DL
MCV RBC AUTO: 79.9 FL
MONOCYTES ABSOLUTE: 0.52 /ΜL
MONOCYTES RELATIVE PERCENT: 6.6 %
NEUTROPHILS ABSOLUTE: 5.34 /ΜL
NEUTROPHILS RELATIVE PERCENT: 67.8 %
NITRITE, URINE: NEGATIVE
PDW BLD-RTO: 12.8 %
PH UA: 6 (ref 4.5–8)
PLATELET # BLD: 262 K/ΜL
PMV BLD AUTO: NORMAL FL
PROTEIN UA: NEGATIVE
RBC # BLD: 5.02 10^6/ΜL
SPECIFIC GRAVITY UA: 1.02 (ref 1–1.03)
UROBILINOGEN, URINE: NORMAL
WBC # BLD: 7.87 10^3/ML

## 2024-06-07 PROCEDURE — G8417 CALC BMI ABV UP PARAM F/U: HCPCS | Performed by: FAMILY MEDICINE

## 2024-06-07 PROCEDURE — 1036F TOBACCO NON-USER: CPT | Performed by: FAMILY MEDICINE

## 2024-06-07 PROCEDURE — 99213 OFFICE O/P EST LOW 20 MIN: CPT | Performed by: FAMILY MEDICINE

## 2024-06-07 PROCEDURE — G8427 DOCREV CUR MEDS BY ELIG CLIN: HCPCS | Performed by: FAMILY MEDICINE

## 2024-06-07 RX ORDER — CYCLOBENZAPRINE HCL 5 MG
5 TABLET ORAL EVERY 8 HOURS PRN
COMMUNITY
Start: 2024-06-03

## 2024-06-07 NOTE — PROGRESS NOTES
[facility]  [facility]  [address]  [place] [address]     Date of Visit:  2024  Patient Name: Eugenia Escalante   Patient :  1980     CHIEF COMPLAINT/HPI:     Chief Complaint   Patient presents with    Lower Back Pain    Constipation     Patient is here today for lower back pain and constipation for a little while now states that she is straining to try to use the restroom. Lower right quadrant pain  rates the pain a 7 when it is there        HPI      Eugenia Escalante, 43 y.o. presents today for acute appt.    A week ago, she was vaccuming and thought she strained her back.  Went to urgent care.  Taking tylenol and ibuprofen with some relief.  No relief with flexeril.  Back pain is the worse when sitting.  Pain will radiate into buttuocks.      She has also been struggling with constipation and now is having RLQ pain.  It comes and goes.  No fever.  Last BM was this morning but was small hard glenn.  No blood in stool but did have some slight vaginal bleeding. Still has a little bit of an appetite.  No nausea or vomiting.      REVIEW OF SYSTEM      Review of Systems:   Constitutional:  Negative for chills, fatigue, fever and unexpected weight change.   Eyes:  Negative for visual disturbance.   Respiratory:  Negative for cough, chest tightness, shortness of breath and wheezing.    Cardiovascular:  Negative for chest pain, palpitations and leg swelling.   Psychiatric/Behavioral:  Negative for suicidal ideas. The patient is not nervous/anxious.      REVIEWED INFORMATION      Allergies   Allergen Reactions    Pcn [Penicillins] Hives and Other (See Comments)    Doxycycline Hyclate Other (See Comments)    Levofloxacin Other (See Comments)     \"Joint pain\"         Current Outpatient Medications   Medication Sig Dispense Refill    LORazepam (ATIVAN) 0.5 MG tablet Take 1 tablet by mouth every 8 hours as needed for Anxiety.      sertraline (ZOLOFT) 100 MG tablet Take

## 2024-06-11 DIAGNOSIS — R10.31 RLQ ABDOMINAL PAIN: ICD-10-CM

## 2024-06-18 DIAGNOSIS — N92.0 MENORRHAGIA WITH REGULAR CYCLE: ICD-10-CM

## 2024-06-18 DIAGNOSIS — R23.3 EASY BRUISING: Primary | ICD-10-CM

## 2024-09-26 ENCOUNTER — TELEPHONE (OUTPATIENT)
Age: 44
End: 2024-09-26

## 2024-09-26 DIAGNOSIS — F41.8 SITUATIONAL ANXIETY: Primary | ICD-10-CM

## 2024-09-26 RX ORDER — LORAZEPAM 0.5 MG/1
0.5 TABLET ORAL EVERY 8 HOURS PRN
Qty: 90 TABLET | Refills: 0 | Status: SHIPPED | OUTPATIENT
Start: 2024-09-26 | End: 2024-10-26

## 2024-09-26 NOTE — TELEPHONE ENCOUNTER
Eugenia Escalante is calling to request a refill on the following medication(s):    Medication Request:  Requested Prescriptions     Pending Prescriptions Disp Refills    LORazepam (ATIVAN) 0.5 MG tablet 90 tablet 0     Sig: Take 1 tablet by mouth every 8 hours as needed for Anxiety for up to 30 days. Max Daily Amount: 1.5 mg       Last Visit Date (If Applicable):  6/7/2024    Next Visit Date:    11/22/2024

## 2024-09-26 NOTE — TELEPHONE ENCOUNTER
Patient called to request a med renewal of Ativan, said her script , and she doesn't have an appt until .    Pharmacy:  [facility] [address]    If patient needs to be seen in order to get Ativan, please advise.   She asked if she does need to be seen, can we move up her Nov appt for the physical so she has just the 1 appt?

## 2024-11-25 DIAGNOSIS — F33.41 RECURRENT MAJOR DEPRESSIVE DISORDER, IN PARTIAL REMISSION (HCC): ICD-10-CM

## 2024-11-25 DIAGNOSIS — F41.8 SITUATIONAL ANXIETY: ICD-10-CM

## 2024-11-25 RX ORDER — SERTRALINE HYDROCHLORIDE 100 MG/1
200 TABLET, FILM COATED ORAL DAILY
Qty: 60 TABLET | Refills: 0 | Status: SHIPPED | OUTPATIENT
Start: 2024-11-25

## 2024-11-25 NOTE — TELEPHONE ENCOUNTER
Eugenia Escalante is calling to request a refill on the following medication(s):    Medication Request:  Requested Prescriptions     Pending Prescriptions Disp Refills    sertraline (ZOLOFT) 100 MG tablet [Pharmacy Med Name: Sertraline HCl Oral Tablet 100 MG] 60 tablet 0     Sig: TAKE 2 TABLETS BY MOUTH EVERY DAY       Last Visit Date (If Applicable):  6/7/2024    Next Visit Date:    1/31/2025

## 2024-12-19 DIAGNOSIS — F33.41 RECURRENT MAJOR DEPRESSIVE DISORDER, IN PARTIAL REMISSION (HCC): ICD-10-CM

## 2024-12-19 DIAGNOSIS — F41.8 SITUATIONAL ANXIETY: ICD-10-CM

## 2024-12-20 RX ORDER — SERTRALINE HYDROCHLORIDE 100 MG/1
200 TABLET, FILM COATED ORAL DAILY
Qty: 60 TABLET | Refills: 5 | Status: SHIPPED | OUTPATIENT
Start: 2024-12-20

## 2025-01-31 ENCOUNTER — OFFICE VISIT (OUTPATIENT)
Age: 45
End: 2025-01-31

## 2025-01-31 VITALS
TEMPERATURE: 97.7 F | WEIGHT: 206.8 LBS | DIASTOLIC BLOOD PRESSURE: 90 MMHG | HEART RATE: 71 BPM | SYSTOLIC BLOOD PRESSURE: 130 MMHG | OXYGEN SATURATION: 96 % | HEIGHT: 65 IN | BODY MASS INDEX: 34.45 KG/M2

## 2025-01-31 DIAGNOSIS — Z00.00 ANNUAL WELLNESS VISIT: ICD-10-CM

## 2025-01-31 DIAGNOSIS — F33.41 RECURRENT MAJOR DEPRESSIVE DISORDER, IN PARTIAL REMISSION (HCC): Primary | ICD-10-CM

## 2025-01-31 DIAGNOSIS — E55.9 VITAMIN D DEFICIENCY: ICD-10-CM

## 2025-01-31 DIAGNOSIS — F41.8 SITUATIONAL ANXIETY: ICD-10-CM

## 2025-01-31 DIAGNOSIS — N94.6 DYSMENORRHEA: ICD-10-CM

## 2025-01-31 PROBLEM — R20.0 NUMBNESS OF HAND: Status: RESOLVED | Noted: 2023-09-15 | Resolved: 2025-01-31

## 2025-01-31 RX ORDER — LORAZEPAM 0.5 MG/1
0.5 TABLET ORAL EVERY 6 HOURS PRN
COMMUNITY

## 2025-01-31 RX ORDER — BUSPIRONE HYDROCHLORIDE 5 MG/1
5 TABLET ORAL 3 TIMES DAILY PRN
Qty: 20 TABLET | Refills: 0 | Status: SHIPPED | OUTPATIENT
Start: 2025-01-31 | End: 2025-03-02

## 2025-01-31 RX ORDER — IBUPROFEN 800 MG/1
800 TABLET, FILM COATED ORAL EVERY 8 HOURS PRN
Qty: 120 TABLET | Refills: 1 | Status: SHIPPED | OUTPATIENT
Start: 2025-01-31

## 2025-01-31 SDOH — ECONOMIC STABILITY: FOOD INSECURITY: WITHIN THE PAST 12 MONTHS, YOU WORRIED THAT YOUR FOOD WOULD RUN OUT BEFORE YOU GOT MONEY TO BUY MORE.: NEVER TRUE

## 2025-01-31 SDOH — ECONOMIC STABILITY: FOOD INSECURITY: WITHIN THE PAST 12 MONTHS, THE FOOD YOU BOUGHT JUST DIDN'T LAST AND YOU DIDN'T HAVE MONEY TO GET MORE.: NEVER TRUE

## 2025-01-31 ASSESSMENT — PATIENT HEALTH QUESTIONNAIRE - PHQ9
9. THOUGHTS THAT YOU WOULD BE BETTER OFF DEAD, OR OF HURTING YOURSELF: NOT AT ALL
SUM OF ALL RESPONSES TO PHQ9 QUESTIONS 1 & 2: 1
5. POOR APPETITE OR OVEREATING: NOT AT ALL
10. IF YOU CHECKED OFF ANY PROBLEMS, HOW DIFFICULT HAVE THESE PROBLEMS MADE IT FOR YOU TO DO YOUR WORK, TAKE CARE OF THINGS AT HOME, OR GET ALONG WITH OTHER PEOPLE: NOT DIFFICULT AT ALL
7. TROUBLE CONCENTRATING ON THINGS, SUCH AS READING THE NEWSPAPER OR WATCHING TELEVISION: NOT AT ALL
2. FEELING DOWN, DEPRESSED OR HOPELESS: SEVERAL DAYS
4. FEELING TIRED OR HAVING LITTLE ENERGY: SEVERAL DAYS
SUM OF ALL RESPONSES TO PHQ QUESTIONS 1-9: 2
3. TROUBLE FALLING OR STAYING ASLEEP: NOT AT ALL
6. FEELING BAD ABOUT YOURSELF - OR THAT YOU ARE A FAILURE OR HAVE LET YOURSELF OR YOUR FAMILY DOWN: NOT AT ALL
SUM OF ALL RESPONSES TO PHQ QUESTIONS 1-9: 2
8. MOVING OR SPEAKING SO SLOWLY THAT OTHER PEOPLE COULD HAVE NOTICED. OR THE OPPOSITE, BEING SO FIGETY OR RESTLESS THAT YOU HAVE BEEN MOVING AROUND A LOT MORE THAN USUAL: NOT AT ALL
SUM OF ALL RESPONSES TO PHQ QUESTIONS 1-9: 2
SUM OF ALL RESPONSES TO PHQ QUESTIONS 1-9: 2
1. LITTLE INTEREST OR PLEASURE IN DOING THINGS: NOT AT ALL

## 2025-01-31 NOTE — PROGRESS NOTES
Eugenia Escalante (:  1980) is a 44 y.o. female, Established patient, here for evaluation of the following chief complaint(s):  Health Maintenance (Patient is here for a 6 month check up with no concerns )         Assessment & Plan  1. Anxiety.  She is currently on sertraline and is seeing a therapist. A prescription for buspirone 5 mg has been issued, with instructions to take it up to 3 times daily as needed. Potential side effects, including dizziness, have been discussed. She is advised to start the medication on a day when she can stay home to monitor for dizziness. If the 5 mg dose is ineffective, she may increase the dose to 10 mg. She is encouraged to provide feedback on the medication's effectiveness and any side effects experienced.    2. Elevated blood pressure.  Her blood pressure was 110 at her last visit and today it is 150/100. A recheck of her blood pressure showed BP of 130/90.    3. Low vitamin D.  She has been on a monthly vitamin D regimen but has been inconsistent due to other stressors. It was explained that Trileptal and other antiseizure medications can affect the body's ability to absorb vitamin D. A vitamin D level test will be ordered. She is advised to continue her vitamin D supplementation and to take it consistently.    4. Medication management.  A prescription for ibuprofen 800 mg has been sent to [facility] for use as needed for cramps.    5. Health maintenance.  A comprehensive metabolic panel (CMP), complete blood count (CBC), cholesterol, and blood sugar tests will be ordered.    Follow-up  The patient will follow up in 6 weeks.    PROCEDURE  The patient underwent a diagnostic laparoscopy to investigate potential endometriosis, but the results were negative.    Results    1. Recurrent major depressive disorder, in partial remission (HCC)  2. Situational anxiety  -     busPIRone (BUSPAR) 5 MG tablet; Take 1 tablet by mouth 3 times daily as needed (anxiety), Disp-20

## 2025-03-05 ENCOUNTER — HOSPITAL ENCOUNTER (OUTPATIENT)
Age: 45
Setting detail: SPECIMEN
Discharge: HOME | End: 2025-03-05

## 2025-03-05 DIAGNOSIS — Z00.00 ANNUAL WELLNESS VISIT: ICD-10-CM

## 2025-03-05 DIAGNOSIS — E55.9 VITAMIN D DEFICIENCY: ICD-10-CM

## 2025-03-05 LAB
25(OH)D3 SERPL-MCNC: 24.2 NG/ML (ref 30–100)
ALBUMIN SERPL-MCNC: 4.2 G/DL (ref 3.5–5.2)
ALBUMIN/GLOB SERPL: 1.4 {RATIO} (ref 1–2.5)
ALP SERPL-CCNC: 83 U/L (ref 35–104)
ALT SERPL-CCNC: 18 U/L (ref 10–35)
ANION GAP SERPL CALCULATED.3IONS-SCNC: 9 MMOL/L (ref 9–16)
AST SERPL-CCNC: 20 U/L (ref 10–35)
BASOPHILS # BLD: 0.04 K/UL (ref 0–0.2)
BASOPHILS NFR BLD: 1 % (ref 0–2)
BILIRUB DIRECT SERPL-MCNC: 0.1 MG/DL (ref 0–0.2)
BILIRUB INDIRECT SERPL-MCNC: 0.1 MG/DL (ref 0–1)
BILIRUB SERPL-MCNC: 0.2 MG/DL (ref 0–1.2)
BUN SERPL-MCNC: 12 MG/DL (ref 6–20)
CALCIUM SERPL-MCNC: 9.4 MG/DL (ref 8.6–10.4)
CHLORIDE SERPL-SCNC: 100 MMOL/L (ref 98–107)
CHOLEST SERPL-MCNC: 160 MG/DL (ref 0–199)
CHOLESTEROL/HDL RATIO: 2.3
CO2 SERPL-SCNC: 25 MMOL/L (ref 20–31)
CREAT SERPL-MCNC: 0.7 MG/DL (ref 0.6–0.9)
EOSINOPHIL # BLD: 0.12 K/UL (ref 0–0.44)
EOSINOPHILS RELATIVE PERCENT: 2 % (ref 1–4)
ERYTHROCYTE [DISTWIDTH] IN BLOOD BY AUTOMATED COUNT: 12.7 % (ref 11.8–14.4)
GFR, ESTIMATED: >90 ML/MIN/1.73M2
GLUCOSE SERPL-MCNC: 101 MG/DL (ref 74–99)
HCT VFR BLD AUTO: 41.8 % (ref 36.3–47.1)
HDLC SERPL-MCNC: 70 MG/DL
HGB BLD-MCNC: 14.1 G/DL (ref 11.9–15.1)
IMM GRANULOCYTES # BLD AUTO: <0.03 K/UL (ref 0–0.3)
IMM GRANULOCYTES NFR BLD: 0 %
LDLC SERPL CALC-MCNC: 76 MG/DL (ref 0–100)
LYMPHOCYTES NFR BLD: 1.51 K/UL (ref 1.1–3.7)
LYMPHOCYTES RELATIVE PERCENT: 22 % (ref 24–43)
MCH RBC QN AUTO: 27.1 PG (ref 25.2–33.5)
MCHC RBC AUTO-ENTMCNC: 33.7 G/DL (ref 28.4–34.8)
MCV RBC AUTO: 80.4 FL (ref 82.6–102.9)
MONOCYTES NFR BLD: 0.4 K/UL (ref 0.1–1.2)
MONOCYTES NFR BLD: 6 % (ref 3–12)
NEUTROPHILS NFR BLD: 69 % (ref 36–65)
NEUTS SEG NFR BLD: 4.75 K/UL (ref 1.5–8.1)
NRBC BLD-RTO: 0 PER 100 WBC
PLATELET # BLD AUTO: 240 K/UL (ref 138–453)
PMV BLD AUTO: 10.2 FL (ref 8.1–13.5)
POTASSIUM SERPL-SCNC: 4.8 MMOL/L (ref 3.7–5.3)
PROT SERPL-MCNC: 7.1 G/DL (ref 6.6–8.7)
RBC # BLD AUTO: 5.2 M/UL (ref 3.95–5.11)
RBC # BLD: ABNORMAL 10*6/UL
SODIUM SERPL-SCNC: 134 MMOL/L (ref 136–145)
TRIGL SERPL-MCNC: 68 MG/DL
VLDLC SERPL CALC-MCNC: 14 MG/DL (ref 1–30)
WBC OTHER # BLD: 6.8 K/UL (ref 3.5–11.3)

## 2025-03-10 ENCOUNTER — RESULTS FOLLOW-UP (OUTPATIENT)
Age: 45
End: 2025-03-10

## 2025-03-14 ENCOUNTER — OFFICE VISIT (OUTPATIENT)
Age: 45
End: 2025-03-14
Payer: COMMERCIAL

## 2025-03-14 VITALS
TEMPERATURE: 98.7 F | DIASTOLIC BLOOD PRESSURE: 90 MMHG | SYSTOLIC BLOOD PRESSURE: 126 MMHG | HEART RATE: 91 BPM | BODY MASS INDEX: 35.38 KG/M2 | WEIGHT: 212.6 LBS | OXYGEN SATURATION: 97 %

## 2025-03-14 DIAGNOSIS — F41.8 SITUATIONAL ANXIETY: ICD-10-CM

## 2025-03-14 DIAGNOSIS — F33.41 RECURRENT MAJOR DEPRESSIVE DISORDER, IN PARTIAL REMISSION: Primary | ICD-10-CM

## 2025-03-14 DIAGNOSIS — J06.9 VIRAL UPPER RESPIRATORY TRACT INFECTION: ICD-10-CM

## 2025-03-14 DIAGNOSIS — E55.9 VITAMIN D DEFICIENCY: ICD-10-CM

## 2025-03-14 DIAGNOSIS — R03.0 ELEVATED BP WITHOUT DIAGNOSIS OF HYPERTENSION: ICD-10-CM

## 2025-03-14 PROCEDURE — 1036F TOBACCO NON-USER: CPT | Performed by: FAMILY MEDICINE

## 2025-03-14 PROCEDURE — G8417 CALC BMI ABV UP PARAM F/U: HCPCS | Performed by: FAMILY MEDICINE

## 2025-03-14 PROCEDURE — G8427 DOCREV CUR MEDS BY ELIG CLIN: HCPCS | Performed by: FAMILY MEDICINE

## 2025-03-14 PROCEDURE — 99214 OFFICE O/P EST MOD 30 MIN: CPT | Performed by: FAMILY MEDICINE

## 2025-03-14 RX ORDER — CEFUROXIME AXETIL 500 MG/1
500 TABLET ORAL
COMMUNITY
Start: 2025-03-10

## 2025-03-14 NOTE — PROGRESS NOTES
07:32 AM        No results found for: \"MALBCR\"     Lab Results   Component Value Date    CHOL 160 03/05/2025    TRIG 68 03/05/2025    HDL 70 03/05/2025    VLDL 14 03/05/2025    CHOLHDLRATIO 2.3 03/05/2025        Lab Results   Component Value Date    WBC 6.8 03/05/2025    HGB 14.1 03/05/2025    HCT 41.8 03/05/2025    MCV 80.4 (L) 03/05/2025     03/05/2025       Lab Results   Component Value Date    ALT 18 03/05/2025    AST 20 03/05/2025    ALKPHOS 83 03/05/2025    BILITOT 0.2 03/05/2025        1. Recurrent major depressive disorder, in partial remission  2. Situational anxiety  3. Elevated BP without diagnosis of hypertension  4. Vitamin D deficiency  5. Viral upper respiratory tract infection    No follow-ups on file.       Subjective   History of Present Illness  The patient presents for evaluation of anxiety, nasal congestion, and hypertension.    She has not yet started BuSpar due to a concurrent tooth infection, which required antibiotic treatment. She reports an improvement in her anxiety levels, attributing this to a recent visit from her brother and his family, which she found beneficial. She attended her brother's graduation ceremony, where she encountered her father, a source of significant anxiety for her. However, she managed to avoid interaction with him, which she believes was a positive step. She has not had any further contact with her father since the graduation. She also mentions that her home environment, including her relationship with her  and children, is stable. She expresses concern about a wound on her 17-year-old cat, but notes that it appears to be healing.    She has been experiencing unilateral nasal congestion since Wednesday, accompanied by increased coughing, sneezing, and fatigue. She also reports a loss of appetite and mild headaches. She is uncertain whether these symptoms are indicative of allergies or a sinus infection. She is currently taking Ceftin and used NyQuil

## 2025-03-25 ENCOUNTER — TELEPHONE (OUTPATIENT)
Age: 45
End: 2025-03-25

## 2025-03-25 DIAGNOSIS — J01.00 ACUTE MAXILLARY SINUSITIS, RECURRENCE NOT SPECIFIED: Primary | ICD-10-CM

## 2025-03-25 RX ORDER — AZITHROMYCIN 250 MG/1
TABLET, FILM COATED ORAL
Qty: 6 TABLET | Refills: 0 | Status: SHIPPED | OUTPATIENT
Start: 2025-03-25 | End: 2025-04-04

## 2025-03-25 NOTE — TELEPHONE ENCOUNTER
Patient was here 3/14 and had a sinus infection patient is not feeling better and was wondering if it can be bronchitis, cough, feverish, some SOB , congestion patient is wondering if something can be sent in

## 2025-03-25 NOTE — TELEPHONE ENCOUNTER
Script for zpak sent to her pharmacy- [facility] patient

## 2025-06-23 SDOH — ECONOMIC STABILITY: FOOD INSECURITY: WITHIN THE PAST 12 MONTHS, THE FOOD YOU BOUGHT JUST DIDN'T LAST AND YOU DIDN'T HAVE MONEY TO GET MORE.: NEVER TRUE

## 2025-06-23 SDOH — ECONOMIC STABILITY: TRANSPORTATION INSECURITY
IN THE PAST 12 MONTHS, HAS THE LACK OF TRANSPORTATION KEPT YOU FROM MEDICAL APPOINTMENTS OR FROM GETTING MEDICATIONS?: NO

## 2025-06-23 SDOH — ECONOMIC STABILITY: FOOD INSECURITY: WITHIN THE PAST 12 MONTHS, YOU WORRIED THAT YOUR FOOD WOULD RUN OUT BEFORE YOU GOT MONEY TO BUY MORE.: NEVER TRUE

## 2025-06-23 SDOH — ECONOMIC STABILITY: INCOME INSECURITY: IN THE LAST 12 MONTHS, WAS THERE A TIME WHEN YOU WERE NOT ABLE TO PAY THE MORTGAGE OR RENT ON TIME?: NO

## 2025-06-23 SDOH — ECONOMIC STABILITY: TRANSPORTATION INSECURITY
IN THE PAST 12 MONTHS, HAS LACK OF TRANSPORTATION KEPT YOU FROM MEETINGS, WORK, OR FROM GETTING THINGS NEEDED FOR DAILY LIVING?: NO

## 2025-06-23 ASSESSMENT — PATIENT HEALTH QUESTIONNAIRE - PHQ9
1. LITTLE INTEREST OR PLEASURE IN DOING THINGS: NOT AT ALL
SUM OF ALL RESPONSES TO PHQ9 QUESTIONS 1 & 2: 0
SUM OF ALL RESPONSES TO PHQ QUESTIONS 1-9: 0
1. LITTLE INTEREST OR PLEASURE IN DOING THINGS: NOT AT ALL
SUM OF ALL RESPONSES TO PHQ QUESTIONS 1-9: 0
2. FEELING DOWN, DEPRESSED OR HOPELESS: NOT AT ALL
2. FEELING DOWN, DEPRESSED OR HOPELESS: NOT AT ALL
SUM OF ALL RESPONSES TO PHQ QUESTIONS 1-9: 0
SUM OF ALL RESPONSES TO PHQ QUESTIONS 1-9: 0

## 2025-06-24 ENCOUNTER — OFFICE VISIT (OUTPATIENT)
Age: 45
End: 2025-06-24
Payer: COMMERCIAL

## 2025-06-24 VITALS
HEIGHT: 65 IN | TEMPERATURE: 97.8 F | OXYGEN SATURATION: 99 % | SYSTOLIC BLOOD PRESSURE: 132 MMHG | WEIGHT: 213 LBS | HEART RATE: 72 BPM | DIASTOLIC BLOOD PRESSURE: 88 MMHG | BODY MASS INDEX: 35.49 KG/M2

## 2025-06-24 DIAGNOSIS — F33.41 RECURRENT MAJOR DEPRESSIVE DISORDER, IN PARTIAL REMISSION: Primary | ICD-10-CM

## 2025-06-24 DIAGNOSIS — F41.8 SITUATIONAL ANXIETY: ICD-10-CM

## 2025-06-24 DIAGNOSIS — G40.909 EPILEPTIC SEIZURE (HCC): ICD-10-CM

## 2025-06-24 DIAGNOSIS — E55.9 VITAMIN D DEFICIENCY: ICD-10-CM

## 2025-06-24 PROCEDURE — 1036F TOBACCO NON-USER: CPT | Performed by: FAMILY MEDICINE

## 2025-06-24 PROCEDURE — 99213 OFFICE O/P EST LOW 20 MIN: CPT | Performed by: FAMILY MEDICINE

## 2025-06-24 PROCEDURE — G8417 CALC BMI ABV UP PARAM F/U: HCPCS | Performed by: FAMILY MEDICINE

## 2025-06-24 PROCEDURE — G8427 DOCREV CUR MEDS BY ELIG CLIN: HCPCS | Performed by: FAMILY MEDICINE

## 2025-06-24 RX ORDER — SERTRALINE HYDROCHLORIDE 100 MG/1
200 TABLET, FILM COATED ORAL DAILY
Qty: 60 TABLET | Refills: 5 | Status: SHIPPED | OUTPATIENT
Start: 2025-06-24

## 2025-06-24 NOTE — PROGRESS NOTES
Eugeina Escalante (:  1980) is a 44 y.o. female, Established patient, here for evaluation of the following chief complaint(s):  3 Month Follow-Up         Assessment & Plan  1. Seizure disorder.  - Continues to see her neurologist for management.  - Will inform the neurologist of any changes in condition or medication regimen.  - Discussed the need for a prescription for the COVID-19 vaccine if restrictions are implemented.  - Vitamin D absorption affected by antiseizure medication; will discuss with endocrinologist.    2. Obsessive-Compulsive Disorder (OCD).  - OCD is currently under control with Zoloft 200 mg.  - Advised to continue current medication regimen and monitor for symptom exacerbation during stress.  - Therapist will start working on stress tolerance techniques using DBT.  - Has not tried BuSpar yet; used Ativan once but prefers not to rely on it.    3. Irregular menstrual cycles.  - Reports intermenstrual bleeding and changes in menstrual cycle.  - Symptoms could indicate perimenopause or fibroids.  - Has an appointment with gynecologist this week for further evaluation.  - Previous laparoscopy showed fibroids; discussed potential impact on treatment options like IUD or ablation.    4. Blood pressure management.  - Blood pressure has improved to 132/88 mmHg since last visit.  - No medication required at this time.  - Advised to continue regular blood pressure monitoring.  - Follow-up scheduled in 6 months.    Results    1. Recurrent major depressive disorder, in partial remission  The following orders have not been finalized:  -     sertraline (ZOLOFT) 100 MG tablet  2. Vitamin D deficiency  3. Hypothyroidism, unspecified type  4. Epileptic seizure (HCC)  5. Situational anxiety  The following orders have not been finalized:  -     sertraline (ZOLOFT) 100 MG tablet    No follow-ups on file.       Subjective   History of Present Illness  The patient presents for evaluation of seizure disorder,

## 2025-07-25 ENCOUNTER — HOSPITAL ENCOUNTER (EMERGENCY)
Facility: CLINIC | Age: 45
Discharge: HOME | End: 2025-07-25
Attending: EMERGENCY MEDICINE
Payer: COMMERCIAL

## 2025-07-25 ENCOUNTER — APPOINTMENT (OUTPATIENT)
Dept: CT IMAGING | Facility: CLINIC | Age: 45
End: 2025-07-25
Payer: COMMERCIAL

## 2025-07-25 VITALS
RESPIRATION RATE: 18 BRPM | WEIGHT: 212 LBS | HEART RATE: 86 BPM | BODY MASS INDEX: 35.32 KG/M2 | OXYGEN SATURATION: 99 % | TEMPERATURE: 98.1 F | DIASTOLIC BLOOD PRESSURE: 100 MMHG | SYSTOLIC BLOOD PRESSURE: 162 MMHG | HEIGHT: 65 IN

## 2025-07-25 DIAGNOSIS — S09.90XA CLOSED HEAD INJURY, INITIAL ENCOUNTER: Primary | ICD-10-CM

## 2025-07-25 PROCEDURE — 70450 CT HEAD/BRAIN W/O DYE: CPT

## 2025-07-25 PROCEDURE — 72125 CT NECK SPINE W/O DYE: CPT

## 2025-07-25 PROCEDURE — 99284 EMERGENCY DEPT VISIT MOD MDM: CPT

## 2025-07-25 ASSESSMENT — PAIN - FUNCTIONAL ASSESSMENT: PAIN_FUNCTIONAL_ASSESSMENT: NONE - DENIES PAIN

## 2025-07-25 NOTE — DISCHARGE INSTRUCTIONS
Your imaging was negative.  Can take 600 mg of Motrin and 1 g of Tylenol every 6-8 hours as needed for pain.  Avoid further injury/trauma. Follow up with primary care doctor or emergency department as needed

## 2025-07-25 NOTE — ED PROVIDER NOTES
eMERGENCY dEPARTMENT  Attending Physician Attestation     Pt Name: Eugenia Escalante  MRN: [id number]  Birthdate 1980  Date of evaluation: 7/25/25     Eugenia Escalante is a 44 y.o. female with CC: Head Injury (Pt presents to ED with complaints of head injury 20 minutes ago when she was opening the curtains and the metal curtain pollo struck her in the top of head. Pt denies LOC, or being on anticoagulation. )      I was available to render services if needed but did not directly participate in the care of the patient.    Vitals Reviewed:    Vitals:    07/25/25 1049   BP: (!) 162/100   Pulse: 86   Resp: 18   Temp: 98.1 °F (36.7 °C)   TempSrc: Oral   SpO2: 99%   Weight: 96.2 kg (212 lb)   Height: 1.651 m (5' 5\")       Initial Pain Score:    Last Pain Score:      Joseph Gunderson MD  Attending Emergency Physician                Joseph Gunderson MD  07/25/25 4622    
        CURRENT MEDICATIONS       Previous Medications    IBUPROFEN (ADVIL;MOTRIN) 800 MG TABLET    Take 1 tablet by mouth every 8 hours as needed for Pain    LEVOTHYROXINE (SYNTHROID) 25 MCG TABLET    Take 1 tablet by mouth daily    LORAZEPAM (ATIVAN) 0.5 MG TABLET    Take 1 tablet by mouth every 6 hours as needed for Anxiety.    OXCARBAZEPINE (TRILEPTAL) 600 MG TABLET    Take 1 tablet (600 mg total) by mouth in the morning and 1 tablet (600 mg total) before bedtime.    SERTRALINE (ZOLOFT) 100 MG TABLET    Take 2 tablets by mouth daily    VITAMIN D (ERGOCALCIFEROL) 1.25 MG (21765 UT) CAPS CAPSULE    Take 1 capsule by mouth once a week       ALLERGIES     Pcn [penicillins], Doxycycline hyclate, and Levofloxacin    FAMILY HISTORY       Family History   Problem Relation Age of Onset    Depression Mother           SOCIAL HISTORY       Social History     Socioeconomic History    Marital status:      Spouse name: None    Number of children: None    Years of education: None    Highest education level: None   Tobacco Use    Smoking status: Never    Smokeless tobacco: Never   Substance and Sexual Activity    Alcohol use: Never    Drug use: Not Currently    Sexual activity: Yes     Partners: Male     Social Drivers of Health     Financial Resource Strain: Low Risk  (9/15/2023)    Overall Financial Resource Strain (CARDIA)     Difficulty of Paying Living Expenses: Not hard at all   Food Insecurity: No Food Insecurity (6/27/2025)    Received from [facility]    Hunger Screening     Within the past 12 months we worried whether our food would run out before we got money to buy more.: Never True     Within the past 12 months the food we bought just didn't last and we didn't have money to get more.: Never True   Transportation Needs: No Transportation Needs (6/23/2025)    PRAPARE - Transportation     Lack of Transportation (Medical): No     Lack of Transportation (Non-Medical): No    Received from [facility]